# Patient Record
Sex: FEMALE | URBAN - METROPOLITAN AREA
[De-identification: names, ages, dates, MRNs, and addresses within clinical notes are randomized per-mention and may not be internally consistent; named-entity substitution may affect disease eponyms.]

---

## 2020-12-10 ENCOUNTER — LAB REQUISITION (OUTPATIENT)
Dept: LAB | Facility: HOSPITAL | Age: 80
End: 2020-12-10
Payer: MEDICARE

## 2020-12-10 DIAGNOSIS — Z87.440 PERSONAL HISTORY OF URINARY (TRACT) INFECTIONS: ICD-10-CM

## 2020-12-10 DIAGNOSIS — I10 ESSENTIAL (PRIMARY) HYPERTENSION: ICD-10-CM

## 2020-12-10 PROCEDURE — 87086 URINE CULTURE/COLONY COUNT: CPT | Performed by: FAMILY MEDICINE

## 2020-12-10 PROCEDURE — 81001 URINALYSIS AUTO W/SCOPE: CPT | Performed by: FAMILY MEDICINE

## 2021-12-06 ENCOUNTER — NURSE ONLY (OUTPATIENT)
Dept: LAB | Age: 81
End: 2021-12-06
Attending: INTERNAL MEDICINE
Payer: MEDICARE

## 2021-12-06 DIAGNOSIS — R82.998: Primary | ICD-10-CM

## 2021-12-06 DIAGNOSIS — I10 HTN (HYPERTENSION): ICD-10-CM

## 2021-12-06 PROCEDURE — 82607 VITAMIN B-12: CPT

## 2021-12-06 PROCEDURE — 84443 ASSAY THYROID STIM HORMONE: CPT

## 2021-12-06 PROCEDURE — 82306 VITAMIN D 25 HYDROXY: CPT

## 2021-12-06 PROCEDURE — 80061 LIPID PANEL: CPT

## 2021-12-06 PROCEDURE — 85025 COMPLETE CBC W/AUTO DIFF WBC: CPT

## 2021-12-06 PROCEDURE — 80053 COMPREHEN METABOLIC PANEL: CPT

## 2022-02-09 ENCOUNTER — HOSPITAL ENCOUNTER (EMERGENCY)
Facility: HOSPITAL | Age: 82
Discharge: HOME | End: 2022-02-09
Attending: STUDENT IN AN ORGANIZED HEALTH CARE EDUCATION/TRAINING PROGRAM
Payer: MEDICARE

## 2022-02-09 ENCOUNTER — APPOINTMENT (OUTPATIENT)
Dept: CT IMAGING | Facility: HOSPITAL | Age: 82
End: 2022-02-09
Attending: STUDENT IN AN ORGANIZED HEALTH CARE EDUCATION/TRAINING PROGRAM
Payer: MEDICARE

## 2022-02-09 VITALS
OXYGEN SATURATION: 100 % | BODY MASS INDEX: 29.44 KG/M2 | TEMPERATURE: 98 F | HEART RATE: 67 BPM | SYSTOLIC BLOOD PRESSURE: 135 MMHG | HEIGHT: 62 IN | WEIGHT: 160 LBS | DIASTOLIC BLOOD PRESSURE: 83 MMHG | RESPIRATION RATE: 19 BRPM

## 2022-02-09 DIAGNOSIS — F03.90 DEMENTIA WITHOUT BEHAVIORAL DISTURBANCE, UNSPECIFIED DEMENTIA TYPE (HCC): ICD-10-CM
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
## 2022-02-09 DIAGNOSIS — D64.9 ANEMIA, UNSPECIFIED TYPE: ICD-10-CM

## 2022-02-09 LAB
ALBUMIN SERPL-MCNC: 3.2 G/DL (ref 3.4–5)
ALBUMIN/GLOB SERPL: 1.1 {RATIO} (ref 1–2)
ALP LIVER SERPL-CCNC: 51 U/L
ALT SERPL-CCNC: 17 U/L
APTT PPP: 26.7 SECONDS (ref 23.3–35.6)
AST SERPL-CCNC: 14 U/L (ref 15–37)
BASOPHILS # BLD AUTO: 0.03 X10(3) UL (ref 0–0.2)
BASOPHILS NFR BLD AUTO: 0.6 %
BILIRUB SERPL-MCNC: 0.4 MG/DL (ref 0.1–2)
BILIRUB UR QL STRIP.AUTO: NEGATIVE
BUN BLD-MCNC: 24 MG/DL (ref 7–18)
CALCIUM BLD-MCNC: 9 MG/DL (ref 8.5–10.1)
CHLORIDE SERPL-SCNC: 111 MMOL/L (ref 98–112)
CLARITY UR REFRACT.AUTO: CLEAR
CO2 SERPL-SCNC: 26 MMOL/L (ref 21–32)
CREAT BLD-MCNC: 0.56 MG/DL
EOSINOPHIL # BLD AUTO: 0.1 X10(3) UL (ref 0–0.7)
EOSINOPHIL NFR BLD AUTO: 1.9 %
ERYTHROCYTE [DISTWIDTH] IN BLOOD BY AUTOMATED COUNT: 13.2 %
GLOBULIN PLAS-MCNC: 3 G/DL (ref 2.8–4.4)
GLUCOSE BLD-MCNC: 86 MG/DL (ref 70–99)
GLUCOSE UR STRIP.AUTO-MCNC: NEGATIVE MG/DL
HCT VFR BLD AUTO: 33.6 %
HGB BLD-MCNC: 11.1 G/DL
IMM GRANULOCYTES # BLD AUTO: 0.02 X10(3) UL (ref 0–1)
IMM GRANULOCYTES NFR BLD: 0.4 %
INR BLD: 1.17 (ref 0.8–1.2)
KETONES UR STRIP.AUTO-MCNC: NEGATIVE MG/DL
LEUKOCYTE ESTERASE UR QL STRIP.AUTO: NEGATIVE
LIPASE SERPL-CCNC: 136 U/L (ref 73–393)
LYMPHOCYTES # BLD AUTO: 1.46 X10(3) UL (ref 1–4)
LYMPHOCYTES NFR BLD AUTO: 28 %
MCH RBC QN AUTO: 28.7 PG (ref 26–34)
MCHC RBC AUTO-ENTMCNC: 33 G/DL (ref 31–37)
MCV RBC AUTO: 86.8 FL
MONOCYTES # BLD AUTO: 0.56 X10(3) UL (ref 0.1–1)
MONOCYTES NFR BLD AUTO: 10.7 %
NEUTROPHILS # BLD AUTO: 3.04 X10 (3) UL (ref 1.5–7.7)
NEUTROPHILS # BLD AUTO: 3.04 X10(3) UL (ref 1.5–7.7)
NEUTROPHILS NFR BLD AUTO: 58.4 %
NITRITE UR QL STRIP.AUTO: NEGATIVE
OSMOLALITY SERPL CALC.SUM OF ELEC: 295 MOSM/KG (ref 275–295)
PH UR STRIP.AUTO: 7 [PH] (ref 5–8)
PLATELET # BLD AUTO: 253 10(3)UL (ref 150–450)
POTASSIUM SERPL-SCNC: 3.6 MMOL/L (ref 3.5–5.1)
PROT SERPL-MCNC: 6.2 G/DL (ref 6.4–8.2)
PROT UR STRIP.AUTO-MCNC: NEGATIVE MG/DL
PROTHROMBIN TIME: 14.9 SECONDS (ref 11.6–14.8)
RBC # BLD AUTO: 3.87 X10(6)UL
SODIUM SERPL-SCNC: 141 MMOL/L (ref 136–145)
SP GR UR STRIP.AUTO: 1.01 (ref 1–1.03)
TROPONIN I HIGH SENSITIVITY: 7 NG/L
WBC # BLD AUTO: 5.2 X10(3) UL (ref 4–11)

## 2022-02-09 PROCEDURE — 74177 CT ABD & PELVIS W/CONTRAST: CPT | Performed by: STUDENT IN AN ORGANIZED HEALTH CARE EDUCATION/TRAINING PROGRAM

## 2022-02-09 PROCEDURE — 99284 EMERGENCY DEPT VISIT MOD MDM: CPT

## 2022-02-09 PROCEDURE — 72125 CT NECK SPINE W/O DYE: CPT | Performed by: STUDENT IN AN ORGANIZED HEALTH CARE EDUCATION/TRAINING PROGRAM

## 2022-02-09 PROCEDURE — 70450 CT HEAD/BRAIN W/O DYE: CPT | Performed by: STUDENT IN AN ORGANIZED HEALTH CARE EDUCATION/TRAINING PROGRAM

## 2022-02-09 PROCEDURE — 83690 ASSAY OF LIPASE: CPT | Performed by: STUDENT IN AN ORGANIZED HEALTH CARE EDUCATION/TRAINING PROGRAM

## 2022-02-09 PROCEDURE — 93010 ELECTROCARDIOGRAM REPORT: CPT

## 2022-02-09 PROCEDURE — 85730 THROMBOPLASTIN TIME PARTIAL: CPT | Performed by: STUDENT IN AN ORGANIZED HEALTH CARE EDUCATION/TRAINING PROGRAM

## 2022-02-09 PROCEDURE — 85025 COMPLETE CBC W/AUTO DIFF WBC: CPT | Performed by: STUDENT IN AN ORGANIZED HEALTH CARE EDUCATION/TRAINING PROGRAM

## 2022-02-09 PROCEDURE — 36415 COLL VENOUS BLD VENIPUNCTURE: CPT

## 2022-02-09 PROCEDURE — 81001 URINALYSIS AUTO W/SCOPE: CPT | Performed by: STUDENT IN AN ORGANIZED HEALTH CARE EDUCATION/TRAINING PROGRAM

## 2022-02-09 PROCEDURE — 84484 ASSAY OF TROPONIN QUANT: CPT | Performed by: STUDENT IN AN ORGANIZED HEALTH CARE EDUCATION/TRAINING PROGRAM

## 2022-02-09 PROCEDURE — 80053 COMPREHEN METABOLIC PANEL: CPT | Performed by: STUDENT IN AN ORGANIZED HEALTH CARE EDUCATION/TRAINING PROGRAM

## 2022-02-09 PROCEDURE — 93005 ELECTROCARDIOGRAM TRACING: CPT

## 2022-02-09 PROCEDURE — 85610 PROTHROMBIN TIME: CPT | Performed by: STUDENT IN AN ORGANIZED HEALTH CARE EDUCATION/TRAINING PROGRAM

## 2022-02-09 RX ORDER — ALPRAZOLAM 0.25 MG/1
0.25 TABLET ORAL NIGHTLY PRN
COMMUNITY

## 2022-02-09 RX ORDER — MEMANTINE HYDROCHLORIDE 5 MG/1
5 TABLET ORAL 2 TIMES DAILY
COMMUNITY

## 2022-02-09 RX ORDER — METOPROLOL SUCCINATE 25 MG/1
25 TABLET, EXTENDED RELEASE ORAL DAILY
COMMUNITY

## 2022-02-09 RX ORDER — ERGOCALCIFEROL 1.25 MG/1
CAPSULE ORAL
COMMUNITY

## 2022-02-09 RX ORDER — IOHEXOL 350 MG/ML
100 INJECTION, SOLUTION INTRAVENOUS
Status: COMPLETED | OUTPATIENT
Start: 2022-02-09 | End: 2022-02-09

## 2022-02-09 RX ORDER — DOCUSATE SODIUM 100 MG/1
100 CAPSULE, LIQUID FILLED ORAL 2 TIMES DAILY
COMMUNITY

## 2022-02-09 RX ORDER — PANTOPRAZOLE SODIUM 40 MG/1
40 TABLET, DELAYED RELEASE ORAL
COMMUNITY

## 2022-02-09 NOTE — ED INITIAL ASSESSMENT (HPI)
Patient here via EMS with c/o fall. NH staff found patient on floor next to her bed wrapped in her comforter while doing their rounds. Patient at baseline AOx1. C-collar applied by EMS.

## 2022-02-09 NOTE — ED QUICK NOTES
Pt resting in cart with son at bedside, remains in c-collar pending ct results, moaning while bp cuff inflating, on monitor

## 2022-02-10 LAB
ATRIAL RATE: 69 BPM
P AXIS: 72 DEGREES
P-R INTERVAL: 174 MS
Q-T INTERVAL: 436 MS
QRS DURATION: 68 MS
QTC CALCULATION (BEZET): 467 MS
R AXIS: 63 DEGREES
T AXIS: 71 DEGREES
VENTRICULAR RATE: 69 BPM

## 2024-10-01 ENCOUNTER — APPOINTMENT (OUTPATIENT)
Dept: CT IMAGING | Facility: HOSPITAL | Age: 84
End: 2024-10-01
Attending: EMERGENCY MEDICINE
Payer: MEDICARE

## 2024-10-01 ENCOUNTER — HOSPITAL ENCOUNTER (INPATIENT)
Facility: HOSPITAL | Age: 84
LOS: 3 days | Discharge: NURSING, REHAB OR LONG-TERM CARE | End: 2024-10-04
Attending: EMERGENCY MEDICINE | Admitting: INTERNAL MEDICINE
Payer: MEDICARE

## 2024-10-01 ENCOUNTER — APPOINTMENT (OUTPATIENT)
Dept: ULTRASOUND IMAGING | Facility: HOSPITAL | Age: 84
End: 2024-10-01
Attending: EMERGENCY MEDICINE
Payer: MEDICARE

## 2024-10-01 DIAGNOSIS — G30.1 SEVERE LATE ONSET ALZHEIMER'S DEMENTIA, UNSPECIFIED WHETHER BEHAVIORAL, PSYCHOTIC, OR MOOD DISTURBANCE OR ANXIETY (HCC): ICD-10-CM

## 2024-10-01 DIAGNOSIS — I82.412 ACUTE DEEP VEIN THROMBOSIS (DVT) OF FEMORAL VEIN OF LEFT LOWER EXTREMITY (HCC): Primary | ICD-10-CM

## 2024-10-01 DIAGNOSIS — F02.C0 SEVERE LATE ONSET ALZHEIMER'S DEMENTIA, UNSPECIFIED WHETHER BEHAVIORAL, PSYCHOTIC, OR MOOD DISTURBANCE OR ANXIETY (HCC): ICD-10-CM

## 2024-10-01 DIAGNOSIS — H10.32 ACUTE BACTERIAL CONJUNCTIVITIS OF LEFT EYE: ICD-10-CM

## 2024-10-01 LAB
ALBUMIN SERPL-MCNC: 3.7 G/DL (ref 3.2–4.8)
ALBUMIN/GLOB SERPL: 1.4 {RATIO} (ref 1–2)
ALP LIVER SERPL-CCNC: 83 U/L
ALT SERPL-CCNC: <7 U/L
ANION GAP SERPL CALC-SCNC: 5 MMOL/L (ref 0–18)
APTT PPP: 27.5 SECONDS (ref 23–36)
AST SERPL-CCNC: 13 U/L (ref ?–34)
ATRIAL RATE: 85 BPM
BASOPHILS # BLD AUTO: 0.05 X10(3) UL (ref 0–0.2)
BASOPHILS NFR BLD AUTO: 0.7 %
BILIRUB SERPL-MCNC: 0.3 MG/DL (ref 0.2–1.1)
BUN BLD-MCNC: 26 MG/DL (ref 9–23)
BUN/CREAT SERPL: 39.4 (ref 10–20)
CALCIUM BLD-MCNC: 9.5 MG/DL (ref 8.7–10.4)
CHLORIDE SERPL-SCNC: 111 MMOL/L (ref 98–112)
CO2 SERPL-SCNC: 28 MMOL/L (ref 21–32)
CREAT BLD-MCNC: 0.66 MG/DL
DEPRECATED RDW RBC AUTO: 43.9 FL (ref 35.1–46.3)
EGFRCR SERPLBLD CKD-EPI 2021: 85 ML/MIN/1.73M2 (ref 60–?)
EOSINOPHIL # BLD AUTO: 0.19 X10(3) UL (ref 0–0.7)
EOSINOPHIL NFR BLD AUTO: 2.8 %
ERYTHROCYTE [DISTWIDTH] IN BLOOD BY AUTOMATED COUNT: 13 % (ref 11–15)
GLOBULIN PLAS-MCNC: 2.6 G/DL (ref 2–3.5)
GLUCOSE BLD-MCNC: 126 MG/DL (ref 70–99)
HCT VFR BLD AUTO: 36.2 %
HGB BLD-MCNC: 11.2 G/DL
IMM GRANULOCYTES # BLD AUTO: 0.02 X10(3) UL (ref 0–1)
IMM GRANULOCYTES NFR BLD: 0.3 %
INR BLD: 1.16 (ref 0.8–1.2)
LYMPHOCYTES # BLD AUTO: 0.99 X10(3) UL (ref 1–4)
LYMPHOCYTES NFR BLD AUTO: 14.4 %
MCH RBC QN AUTO: 28.3 PG (ref 26–34)
MCHC RBC AUTO-ENTMCNC: 30.9 G/DL (ref 31–37)
MCV RBC AUTO: 91.4 FL
MONOCYTES # BLD AUTO: 0.62 X10(3) UL (ref 0.1–1)
MONOCYTES NFR BLD AUTO: 9 %
MRSA DNA SPEC QL NAA+PROBE: NEGATIVE
NEUTROPHILS # BLD AUTO: 5.01 X10 (3) UL (ref 1.5–7.7)
NEUTROPHILS # BLD AUTO: 5.01 X10(3) UL (ref 1.5–7.7)
NEUTROPHILS NFR BLD AUTO: 72.8 %
OSMOLALITY SERPL CALC.SUM OF ELEC: 304 MOSM/KG (ref 275–295)
P AXIS: 50 DEGREES
P-R INTERVAL: 154 MS
PLATELET # BLD AUTO: 297 10(3)UL (ref 150–450)
POTASSIUM SERPL-SCNC: 4.4 MMOL/L (ref 3.5–5.1)
PROT SERPL-MCNC: 6.3 G/DL (ref 5.7–8.2)
PROTHROMBIN TIME: 15.5 SECONDS (ref 11.6–14.8)
Q-T INTERVAL: 374 MS
QRS DURATION: 70 MS
QTC CALCULATION (BEZET): 445 MS
R AXIS: 15 DEGREES
RBC # BLD AUTO: 3.96 X10(6)UL
SODIUM SERPL-SCNC: 144 MMOL/L (ref 136–145)
T AXIS: 54 DEGREES
TROPONIN I SERPL HS-MCNC: 4 NG/L
VENTRICULAR RATE: 85 BPM
WBC # BLD AUTO: 6.9 X10(3) UL (ref 4–11)

## 2024-10-01 PROCEDURE — 93971 EXTREMITY STUDY: CPT | Performed by: EMERGENCY MEDICINE

## 2024-10-01 PROCEDURE — 74177 CT ABD & PELVIS W/CONTRAST: CPT | Performed by: EMERGENCY MEDICINE

## 2024-10-01 PROCEDURE — 71260 CT THORAX DX C+: CPT | Performed by: EMERGENCY MEDICINE

## 2024-10-01 RX ORDER — BISACODYL 10 MG
10 SUPPOSITORY, RECTAL RECTAL DAILY PRN
COMMUNITY

## 2024-10-01 RX ORDER — ERGOCALCIFEROL 1.25 MG/1
50000 CAPSULE, LIQUID FILLED ORAL
Status: DISCONTINUED | OUTPATIENT
Start: 2024-10-13 | End: 2024-10-04

## 2024-10-01 RX ORDER — ERYTHROMYCIN 5 MG/G
1 OINTMENT OPHTHALMIC ONCE
Status: COMPLETED | OUTPATIENT
Start: 2024-10-01 | End: 2024-10-01

## 2024-10-01 RX ORDER — METOPROLOL TARTRATE 25 MG/1
25 TABLET, FILM COATED ORAL 2 TIMES DAILY
Status: DISCONTINUED | OUTPATIENT
Start: 2024-10-01 | End: 2024-10-04

## 2024-10-01 RX ORDER — METOPROLOL TARTRATE 25 MG/1
25 TABLET, FILM COATED ORAL 2 TIMES DAILY
COMMUNITY

## 2024-10-01 RX ORDER — POLYETHYLENE GLYCOL 3350 17 G/17G
17 POWDER, FOR SOLUTION ORAL DAILY
COMMUNITY

## 2024-10-01 RX ORDER — ACETAMINOPHEN 325 MG/1
650 TABLET ORAL EVERY 8 HOURS PRN
COMMUNITY

## 2024-10-01 RX ORDER — MINERAL OIL, PETROLATUM, PHENYLEPHRINE HCL 14; 74.9; .25 G/100G; G/100G; G/100G
1 OINTMENT RECTAL EVERY EVENING
COMMUNITY

## 2024-10-01 RX ORDER — HEPARIN SODIUM 1000 [USP'U]/ML
80 INJECTION, SOLUTION INTRAVENOUS; SUBCUTANEOUS ONCE
Status: COMPLETED | OUTPATIENT
Start: 2024-10-01 | End: 2024-10-01

## 2024-10-01 RX ORDER — SODIUM PHOSPHATE, DIBASIC AND SODIUM PHOSPHATE, MONOBASIC 7; 19 G/230ML; G/230ML
1 ENEMA RECTAL ONCE AS NEEDED
Status: ACTIVE | OUTPATIENT
Start: 2024-10-01 | End: 2024-10-01

## 2024-10-01 RX ORDER — HEPARIN SODIUM AND DEXTROSE 10000; 5 [USP'U]/100ML; G/100ML
18 INJECTION INTRAVENOUS ONCE
Status: COMPLETED | OUTPATIENT
Start: 2024-10-01 | End: 2024-10-02

## 2024-10-01 RX ORDER — POLYETHYLENE GLYCOL 3350 17 G/17G
17 POWDER, FOR SOLUTION ORAL DAILY
Status: DISCONTINUED | OUTPATIENT
Start: 2024-10-01 | End: 2024-10-04

## 2024-10-01 RX ORDER — MEMANTINE HYDROCHLORIDE 5 MG/1
5 TABLET ORAL 2 TIMES DAILY
Status: DISCONTINUED | OUTPATIENT
Start: 2024-10-01 | End: 2024-10-04

## 2024-10-01 RX ORDER — PANTOPRAZOLE SODIUM 20 MG/1
20 TABLET, DELAYED RELEASE ORAL
Status: DISCONTINUED | OUTPATIENT
Start: 2024-10-02 | End: 2024-10-04

## 2024-10-01 RX ORDER — ACETAMINOPHEN 325 MG/1
650 TABLET ORAL EVERY 8 HOURS PRN
Status: DISCONTINUED | OUTPATIENT
Start: 2024-10-01 | End: 2024-10-04

## 2024-10-01 RX ORDER — HEPARIN SODIUM AND DEXTROSE 10000; 5 [USP'U]/100ML; G/100ML
INJECTION INTRAVENOUS CONTINUOUS
Status: DISCONTINUED | OUTPATIENT
Start: 2024-10-01 | End: 2024-10-03

## 2024-10-01 RX ORDER — SODIUM PHOSPHATE,MONO-DIBASIC 19G-7G/118
1 ENEMA (ML) RECTAL ONCE AS NEEDED
COMMUNITY

## 2024-10-01 RX ORDER — BISACODYL 10 MG
10 SUPPOSITORY, RECTAL RECTAL DAILY PRN
Status: DISCONTINUED | OUTPATIENT
Start: 2024-10-01 | End: 2024-10-04

## 2024-10-01 RX ORDER — ENOXAPARIN SODIUM 100 MG/ML
80 INJECTION SUBCUTANEOUS ONCE
Status: DISCONTINUED | OUTPATIENT
Start: 2024-10-01 | End: 2024-10-01

## 2024-10-01 NOTE — ED PROVIDER NOTES
Patient Seen in: [facility] Emergency Department    History     Chief Complaint   Patient presents with    Swelling Edema       HPI    84-year-old female who presents to the ED today from the nursing home due to a rule out DVT.  Patient's doctor went to the nurse from evaluator today noted some left leg swelling.  Patient has been in a wheelchair and not been mobile recently.  Patient unable to give history so all history from nursing home records, son was at the bedside.    History reviewed.   Past Medical History:    Dementia (HCC)    Essential hypertension       History reviewed. History reviewed. No pertinent surgical history.      Medications :  (Not in a hospital admission)       History reviewed. No pertinent family history.    Smoking Status:   Social History     Socioeconomic History    Marital status:    Tobacco Use    Smoking status: Never    Smokeless tobacco: Never   Substance and Sexual Activity    Alcohol use: Not Currently       Constitutional and vital signs reviewed.      Social History and Family History elements reviewed from today, pertinent positives to the presenting problem noted.    Physical Exam     ED Triage Vitals [10/01/24 1309]   /78   Pulse 98   Resp 16   Temp 97.3 °F (36.3 °C)   Temp src Temporal   SpO2 98 %   O2 Device None (Room air)       All measures to prevent infection transmission during my interaction with the patient were taken. Handwashing was performed prior to and after the exam.  Stethoscope and any equipment used during my examination was cleaned with super sani-cloth germicidal wipes following the exam.     Physical Exam  Vitals reviewed.   Eyes:      General:         Left eye: Discharge present.  Cardiovascular:      Rate and Rhythm: Normal rate.   Pulmonary:      Effort: Pulmonary effort is normal.   Abdominal:      Palpations: Abdomen is soft.   Skin:     General: Skin is warm and dry.      Capillary Refill: Capillary refill takes less than 2  seconds.      Comments: Left lower leg +2 pitting edema from the foot up into the knee.   Neurological:      Mental Status: She is alert.         ED Course        Labs Reviewed   CBC WITH DIFFERENTIAL WITH PLATELET - Abnormal; Notable for the following components:       Result Value    HGB 11.2 (*)     MCHC 30.9 (*)     Lymphocyte Absolute 0.99 (*)     All other components within normal limits   COMP METABOLIC PANEL (14) - Abnormal; Notable for the following components:    Glucose 126 (*)     BUN 26 (*)     BUN/CREA Ratio 39.4 (*)     Calculated Osmolality 304 (*)     ALT <7 (*)     All other components within normal limits   PROTHROMBIN TIME (PT) - Abnormal; Notable for the following components:    PT 15.5 (*)     All other components within normal limits   TROPONIN I HIGH SENSITIVITY - Normal   PTT, ACTIVATED - Normal   RAINBOW DRAW BLUE   RAINBOW DRAW GOLD   ED/MRSA SCREEN BY PCR-CC     EKG    Rate, intervals and axes as noted on EKG Report.  Rate: 85  Rhythm: Sinus Rhythm  Reading: Normal sinus rhythm, heart rate 85, normal overall, normal axis           As Interpreted by me    Imaging Results Available and Reviewed while in ED: CT VENOGRAPHY ABDOMEN PELVIS (W/ CONTRAST) (CPT=74177)    Result Date: 10/1/2024  CONCLUSION:  1. Occlusive DVT involving left deep femoral vein, femoral vein, left external iliac vein and internal iliac vein.  Left common iliac vein is compressed by the right common iliac artery suggesting May-Thurner syndrome. 2. Mild nonspecific hazy infiltration of the small bowel mesentery.  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 3. Generalized mural thickening of urinary bladder suggests cystitis.  Correlate with urinalysis. 4. Vertebral compression fractures including a severe L1 vertebral compression fracture.  1.   Dictated by (CST): Gilmar Luis MD on 10/01/2024 at 5:09 PM     Finalized by (CST): Gilmar Luis MD on 10/01/2024 at 5:19 PM          CT CHEST PE AORTA  (IV ONLY) (CPT=71260)    Result Date: 10/1/2024  CONCLUSION:  1. No pulmonary embolus or other acute finding.    Dictated by (CST): Gilmar Luis MD on 10/01/2024 at 5:03 PM     Finalized by (CST): Gilmar Luis MD on 10/01/2024 at 5:09 PM          US VENOUS DOPPLER LEG LEFT - DIAG IMG (CPT=93971)    Result Date: 10/1/2024  CONCLUSION:  1. Extensive nearly occlusive deep venous thrombosis throughout the left leg from the common femoral down to the calf region.  Superficial venous thrombus visualized in the proximal GSV.    Dictated by (CST): Dayron Sinclair MD on 10/01/2024 at 3:07 PM     Finalized by (CST): Dayron Sinclair MD on 10/01/2024 at 3:12 PM         ED Medications Administered:   Medications   heparin (Porcine) 1000 UNIT/ML injection - BOLUS IV 5,800 Units (has no administration in time range)   heparin (Porcine) 99700 units/250 mL infusion ED (PE/DVT/THROMBUS) INITIAL DOSE (has no administration in time range)   heparin (Porcine) 27473 units/250mL infusion PE/DVT/THROMBUS CONTINUOUS (has no administration in time range)   erythromycin (Romycin) 5 MG/GM ophthalmic ointment 1 Application (1 Application Left Eye Given 10/1/24 1458)   iopamidol 76% (ISOVUE-370) injection for power injector (80 mL Intravenous Given 10/1/24 1649)         MDM     Vitals:    10/01/24 1316 10/01/24 1600 10/01/24 1645 10/01/24 1808   BP:       Pulse:  89 90    Resp:  18 14    Temp: 98 °F (36.7 °C)      TempSrc: Rectal      SpO2:  99% 99%    Weight:    72 kg     *I personally reviewed and interpreted all ED vitals.    Pulse Ox: 98%, Room air, Normal     Monitor Interpretation:   normal sinus rhythm as interpreted by me.  The cardiac monitor was ordered to monitor cardiac rate and rhythm.    Differential Diagnosis/ Diagnostic Considerations: DVT, PE, cellulitis    Complicating Factors: The patient already has does not have any pertinent problems on file. to contribute to the complexity of this ED evaluation.    Medical  Decision Making  Amount and/or Complexity of Data Reviewed  Labs: ordered. Decision-making details documented in ED Course.  Radiology: ordered. Decision-making details documented in ED Course.  ECG/medicine tests: ordered and independent interpretation performed. Decision-making details documented in ED Course.    Risk  Decision regarding hospitalization.    Critical Care  Total time providing critical care: 30 minutes      I reviewed all labs with patient's family at the bedside.  Nothing acute on labs however left lower leg ultrasound showed DVT from the left femoral vein down to the knee.  Will get a CT chest to rule out PE and get a CT abdomen venography study.    I reviewed CT results with patient's family.  The DVT does go up into the iliac veins in the abdomen.  No evidence of PE.  Will start heparin drip and need to admit.  Will also consult interventional radiology.  They agree with this plan    I discussed case with patient's primary care provider Dr. Davis who accepts admission    I discussed case with interventional radiology Dr. Rubalcava.  Agrees with heparin drip, keep patient n.p.o. after midnight and will evaluate for possible stent placement in the morning.       Condition upon leaving the department: Stable    Disposition and Plan     Clinical Impression:  1. Acute deep vein thrombosis (DVT) of femoral vein of left lower extremity (HCC)    2. Acute bacterial conjunctivitis of left eye        Disposition:  Admit    Follow-up:  No follow-up provider specified.    Medications Prescribed:  Current Discharge Medication List          Hospital Problems       Present on Admission             ICD-10-CM Noted POA    * (Principal) Acute deep vein thrombosis (DVT) of femoral vein of left lower extremity (HCC) I82.412 10/1/2024 Unknown

## 2024-10-01 NOTE — ED QUICK NOTES
Orders for admission, patient is aware of plan and ready to go upstairs. Any questions, please call ED RN velvet at extension [phone].     Patient Covid vaccination status: Unvaccinated     COVID Test Ordered in ED: None    COVID Suspicion at Admission: N/A    Running Infusions:    See MAR      Mental Status/LOC at time of transport: Alert to self per baseline, hx of dementia     Other pertinent information:   CIWA score: N/A   NIH score:  N/A

## 2024-10-01 NOTE — ED INITIAL ASSESSMENT (HPI)
Pt arrives via EMS from [facility], pt has left leg swelling that staff noticed last night but are unsure of how long swelling has been there.   Pt has hx of dementia and mumbles, per baseline.

## 2024-10-02 LAB
APTT PPP: 117.8 SECONDS (ref 23–36)
APTT PPP: 184.2 SECONDS (ref 23–36)
APTT PPP: 33.1 SECONDS (ref 23–36)
APTT PPP: 96.1 SECONDS (ref 23–36)

## 2024-10-02 RX ORDER — HEPARIN SODIUM 1000 [USP'U]/ML
30 INJECTION, SOLUTION INTRAVENOUS; SUBCUTANEOUS ONCE
Status: COMPLETED | OUTPATIENT
Start: 2024-10-02 | End: 2024-10-02

## 2024-10-02 NOTE — PLAN OF CARE
Patient alert and oriented x 0, which is her baseline per son's report. Pt is on room air and is voiding via purewick. Patient denies pain. Plan to monitor left leg for worsening swelling, any discoloration, or other changes. Continue with Heparin drip to prevent worsening of DVT. Family updated on plan of care, no further questions asked. Plan to discharge once medically clear. Call light within reach.    Problem: Patient Centered Care  Goal: Patient preferences are identified and integrated in the patient's plan of care  Description: Interventions:  - What would you like us to know as we care for you? Patient is from [facility].   - Provide timely, complete, and accurate information to patient/family  - Incorporate patient and family knowledge, values, beliefs, and cultural backgrounds into the planning and delivery of care  - Encourage patient/family to participate in care and decision-making at the level they choose  - Honor patient and family perspectives and choices  Outcome: Progressing     Problem: Patient/Family Goals  Goal: Patient/Family Long Term Goal  Description: Patient's Long Term Goal: To discharge.     Interventions:  - Follow medical plan of care.   - See additional Care Plan goals for specific interventions  Outcome: Progressing  Goal: Patient/Family Short Term Goal  Description: Patient's Short Term Goal: To prevent DVT from worsening.     Interventions:   - Monitor for SOB, monitor LLE, continue Heparin drip.  - See additional Care Plan goals for specific interventions  Outcome: Progressing     Problem: CARDIOVASCULAR - ADULT  Goal: Maintains optimal cardiac output and hemodynamic stability  Description: INTERVENTIONS:  - Monitor vital signs, rhythm, and trends  - Monitor for bleeding, hypotension and signs of decreased cardiac output  - Evaluate effectiveness of vasoactive medications to optimize hemodynamic stability  - Monitor arterial and/or venous puncture sites for bleeding and/or  hematoma  - Assess quality of pulses, skin color and temperature  - Assess for signs of decreased coronary artery perfusion - ex. Angina  - Evaluate fluid balance, assess for edema, trend weights  Outcome: Progressing  Goal: Absence of cardiac arrhythmias or at baseline  Description: INTERVENTIONS:  - Continuous cardiac monitoring, monitor vital signs, obtain 12 lead EKG if indicated  - Evaluate effectiveness of antiarrhythmic and heart rate control medications as ordered  - Initiate emergency measures for life threatening arrhythmias  - Monitor electrolytes and administer replacement therapy as ordered  Outcome: Progressing     Problem: RESPIRATORY - ADULT  Goal: Achieves optimal ventilation and oxygenation  Description: INTERVENTIONS:  - Assess for changes in respiratory status  - Assess for changes in mentation and behavior  - Position to facilitate oxygenation and minimize respiratory effort  - Oxygen supplementation based on oxygen saturation or ABGs  - Provide Smoking Cessation handout, if applicable  - Encourage broncho-pulmonary hygiene including cough, deep breathe, Incentive Spirometry  - Assess the need for suctioning and perform as needed  - Assess and instruct to report SOB or any respiratory difficulty  - Respiratory Therapy support as indicated  - Manage/alleviate anxiety  - Monitor for signs/symptoms of CO2 retention  Outcome: Progressing     Problem: SKIN/TISSUE INTEGRITY - ADULT  Goal: Skin integrity remains intact  Description: INTERVENTIONS  - Assess and document risk factors for pressure ulcer development  - Assess and document skin integrity  - Monitor for areas of redness and/or skin breakdown  - Initiate interventions, skin care algorithm/standards of care as needed  Outcome: Progressing

## 2024-10-02 NOTE — CONSULTS
[facility]  part of [facility]    Report of Consultation    Joann Williamson Patient Status:  Inpatient    1940 MRN [id number]   Location [facility] 3W/SW Attending Dylon Davis MD   Hosp Day # 1 PCP Ruslan Galarza MD     Reason for Consultation:  Left leg swelling, DVT.    History of Present Illness:  Joann Williamson is a a(n) 84 year old female who presents from her nursing home with extensive left leg swelling.  She is essentially wheel chair bound, per her son, she was able to stand to transfer until recently when she developed swelling.  She has dementia and is non-verbal.    History:  Past Medical History:    Dementia (HCC)    Esophageal reflux    Essential hypertension     History reviewed. No pertinent surgical history.  History reviewed. No pertinent family history.   reports that she has never smoked. She has never used smokeless tobacco. She reports that she does not currently use alcohol.    Allergies:  No Known Allergies    Medications:    Current Facility-Administered Medications:     heparin (Porcine) 34073 units/250mL infusion PE/DVT/THROMBUS CONTINUOUS, 200-3,000 Units/hr, Intravenous, Continuous    polyethylene glycol (PEG 3350) (Miralax) 17 g oral packet 17 g, 17 g, Oral, Daily    pantoprazole (Protonix) DR tab 20 mg, 20 mg, Oral, QAM AC    ergocalciferol (Vitamin D2) cap 50,000 Units, 50,000 Units, Oral, Q14 Days    docusate (Colace) 50 MG/5ML oral solution 100 mg, 100 mg, Oral, BID PRN    memantine (Namenda) tab 5 mg, 5 mg, Oral, BID    acetaminophen (Tylenol) tab 650 mg, 650 mg, Oral, Q8H PRN    metoprolol tartrate (Lopressor) tab 25 mg, 25 mg, Oral, BID    bisacodyl (Dulcolax) 10 MG rectal suppository 10 mg, 10 mg, Rectal, Daily PRN    phenylephrine-min oil-rita (Formula R) 0.25-14-74.9 % rectal ointment 1 g, 1 g, Rectal, QPM    influenza virus trivalent high dose PF (Fluzone HD) 0.5 mL injection (ages >/= 65 years) 0.5 mL, 0.5 mL, Intramuscular,  Prior to discharge    Review of Systems:  Pertinent items are noted in HPI.    Physical Exam:   General: Alert, cooperative.  No apparent distress. Non-verbal.  Vital Signs:  Blood pressure (!) 137/98, pulse 77, temperature 97.8 °F (36.6 °C), temperature source Axillary, resp. rate 16, height 62\", weight 126 lb 3.2 oz (57.2 kg), SpO2 97%.  HEENT: Exam is unremarkable.  Neck: Supple.  Lungs: Normal respiratory effort  Cardiac: Regular rate and rhythm.  Abdomen:  Nontender.  Extremities: Left lower 2+ pitting edema noted.  No discoloration, good sensation and movement.   Skin: Normal texture and turgor.     Laboratory Data:  Lab Results   Component Value Date    WBC 6.9 10/01/2024    HGB 11.2 10/01/2024    HCT 36.2 10/01/2024    .0 10/01/2024    CREATSERUM 0.66 10/01/2024    BUN 26 10/01/2024     10/01/2024    K 4.4 10/01/2024     10/01/2024    CO2 28.0 10/01/2024     10/01/2024    CA 9.5 10/01/2024    ALB 3.7 10/01/2024    ALKPHO 83 10/01/2024    BILT 0.3 10/01/2024    TP 6.3 10/01/2024    AST 13 10/01/2024    ALT <7 10/01/2024    .8 10/02/2024    INR 1.16 10/01/2024       Imaging:  CT VENOGRAPHY ABDOMEN PELVIS (W/ CONTRAST) (CPT=74177)    Result Date: 10/1/2024  CONCLUSION:  1. Occlusive DVT involving left deep femoral vein, femoral vein, left external iliac vein and internal iliac vein.  Left common iliac vein is compressed by the right common iliac artery suggesting May-Thurner syndrome. 2. Mild nonspecific hazy infiltration of the small bowel mesentery.  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 3. Generalized mural thickening of urinary bladder suggests cystitis.  Correlate with urinalysis. 4. Vertebral compression fractures including a severe L1 vertebral compression fracture.  1.   Dictated by (CST): Gilmar Luis MD on 10/01/2024 at 5:09 PM     Finalized by (CST): Gilmar Luis MD on 10/01/2024 at 5:19 PM          CT CHEST PE AORTA (IV ONLY)  (CPT=71260)    Result Date: 10/1/2024  CONCLUSION:  1. No pulmonary embolus or other acute finding.    Dictated by (CST): Gilmar Luis MD on 10/01/2024 at 5:03 PM     Finalized by (CST): Gilmar Luis MD on 10/01/2024 at 5:09 PM          US VENOUS DOPPLER LEG LEFT - DIAG IMG (CPT=93971)    Result Date: 10/1/2024  CONCLUSION:  1. Extensive nearly occlusive deep venous thrombosis throughout the left leg from the common femoral down to the calf region.  Superficial venous thrombus visualized in the proximal GSV.    Dictated by (CST): Dayron Sinclair MD on 10/01/2024 at 3:07 PM     Finalized by (CST): Dayron Sinclair MD on 10/01/2024 at 3:12 PM           Impression:  Patient Active Problem List   Diagnosis    Acute deep vein thrombosis (DVT) of femoral vein of left lower extremity (HCC)    Acute bacterial conjunctivitis of left eye       Assessment/Plan:  85yo nursing home patient presents with left leg swelling.  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  Joann is non-verbal, son Berto is at bedside.  Per her son, the swelling has improved since being admitted and on Heparin.  We had a long discussion regarding risks and benefits of thrombectomy and iliac vein stenting.  Risks of bleeding in this patient far outweigh benefit.  Stenting of iliac vein could be considered, but again her swelling has been improving and she is essentially non-ambulatory.    Recommend continued anticoagulation, leg elevation.  Should anti-coagulation be contraindicated, IVC filter can be considered.      Thank you for allowing me to participate in the care of your patient.    JUHIWERO GAMEZ, APRN  10/2/2024  9:30 AM

## 2024-10-02 NOTE — PLAN OF CARE
Pt comes from [facility] in [place]. Plan of care discussed with sonBerto. Heparin infusing. NPO.  Problem: CARDIOVASCULAR - ADULT  Goal: Maintains optimal cardiac output and hemodynamic stability  Description: INTERVENTIONS:  - Monitor vital signs, rhythm, and trends  - Monitor for bleeding, hypotension and signs of decreased cardiac output  - Evaluate effectiveness of vasoactive medications to optimize hemodynamic stability  - Monitor arterial and/or venous puncture sites for bleeding and/or hematoma  - Assess quality of pulses, skin color and temperature  - Assess for signs of decreased coronary artery perfusion - ex. Angina  - Evaluate fluid balance, assess for edema, trend weights  Outcome: Progressing  Goal: Absence of cardiac arrhythmias or at baseline  Description: INTERVENTIONS:  - Continuous cardiac monitoring, monitor vital signs, obtain 12 lead EKG if indicated  - Evaluate effectiveness of antiarrhythmic and heart rate control medications as ordered  - Initiate emergency measures for life threatening arrhythmias  - Monitor electrolytes and administer replacement therapy as ordered  Outcome: Progressing     Problem: RESPIRATORY - ADULT  Goal: Achieves optimal ventilation and oxygenation  Description: INTERVENTIONS:  - Assess for changes in respiratory status  - Assess for changes in mentation and behavior  - Position to facilitate oxygenation and minimize respiratory effort  - Oxygen supplementation based on oxygen saturation or ABGs  - Provide Smoking Cessation handout, if applicable  - Encourage broncho-pulmonary hygiene including cough, deep breathe, Incentive Spirometry  - Assess the need for suctioning and perform as needed  - Assess and instruct to report SOB or any respiratory difficulty  - Respiratory Therapy support as indicated  - Manage/alleviate anxiety  - Monitor for signs/symptoms of CO2 retention  Outcome: Progressing     Problem: SKIN/TISSUE INTEGRITY - ADULT  Goal: Skin  integrity remains intact  Description: INTERVENTIONS  - Assess and document risk factors for pressure ulcer development  - Assess and document skin integrity  - Monitor for areas of redness and/or skin breakdown  - Initiate interventions, skin care algorithm/standards of care as needed  Outcome: Progressing

## 2024-10-02 NOTE — SLP NOTE
ADULT SWALLOWING EVALUATION    ASSESSMENT    ASSESSMENT/OVERALL IMPRESSION:  PPE REQUIRED. THIS THERAPIST WORE GLOVES, DROPLET MASK, AND GOGGLES FOR DURATION OF EVALUATION. HANDS WASHED UPON ENTRANCE/EXIT.    SLP BSSE orders received and acknowledged. A swallow evaluation warranted as pt on modified diet of minced and moist/thin liquids at NH. Pt afebrile, non-verbal at baseline, on room air, with oxygen saturation at 95. Pt with no prior hx of dysphagia at [facility]. Pt positioned upright in bed with son at bedside. Pt with no verbal or non-verbal complaints of pain, RN aware. Pt with adequate oral acceptance and bilabial seal across all trials. Pt with intact bite, prolonged mastication of solids, and increased KIERSTEN. Pt's swallow response appears delayed with reduced hyolaryngeal elevation/excursion. No clinical signs of aspiration (e.g., immediate/delayed throat clear, immediate/delayed cough, wet vocal quality, increased O2 effort) observed across all trials. Oxygen status remained >94 t/o the entire evaluation.     At this time, pt presents with mild oral dysphagia and probable pharyngeal dysfunction. Recommend a minced and moist diet and thin liquids with strict adherence to safe swallowing compensatory strategies. Results and recommendations reviewed with RN, pt, and family. Pt's son v/u to all results/recommendations. Recommendations remain written on whiteboard. SLP collaborated with RN for MD diet orders.     PLAN: SLP tof/u x2 meal assessments, monitor CXR, and VFSS if clinically warranted.     RECOMMENDATIONS   Diet Recommendations - Solids: Mechanical soft ground/ Minced & Moist  Diet Recommendations - Liquids: Thin Liquids    Compensatory Strategies Recommended: Slow rate;Alternate consistencies;Small bites and sips  Aspiration Precautions: Upright position;Slow rate;Small bites and sips  Medication Administration Recommendations: Crushed in puree  Treatment Plan/Recommendations: Aspiration  precautions    HISTORY   MEDICAL HISTORY  Reason for Referral: R/O aspiration    Problem List  Principal Problem:    Acute deep vein thrombosis (DVT) of femoral vein of left lower extremity (HCC)  Active Problems:    Acute bacterial conjunctivitis of left eye      Past Medical History  Past Medical History:    Dementia (HCC)    Esophageal reflux    Essential hypertension       Prior Living Situation: Skilled nursing facility  Diet Prior to Admission: Mechanical soft ground/ Minced & Moist;Thin liquids  Precautions: Aspiration    Patient/Family Goals: Pt's son reports modified diet of minced and moist/thin    SWALLOWING HISTORY  Current Diet Consistency: NPO  Dysphagia History: none at [facility]    Imaging Results:     CHEST CT 10/1/24:  CONCLUSION:   1. No pulmonary embolus or other acute finding.          Dictated by (CST): Gilmar Luis MD on 10/01/2024 at 5:03 PM       Finalized by (CST): Gilmar Luis MD on 10/01/2024 at 5:09 PM     OBJECTIVE   ORAL MOTOR EXAMINATION  Dentition: Functional  Symmetry: Unable to assess  Strength: Unable to assess     Range of Motion: Unable to assess  Rate of Motion: Unable to assess    Voice Quality:  (non-verbal/mumbles)  Respiratory Status: Unlabored  Consistencies Trialed: Thin liquids;Puree;Soft solid  Method of Presentation: Staff/Clinician assistance;Spoon;Cup;Single sips;Straw  Patient Positioning: Upright;Leaning to left    Oral Phase of Swallow: Impaired  Bolus Retrieval: Intact  Bilabial Seal: Intact  Bolus Formation: Impaired  Bolus Propulsion: Impaired  Mastication: Impaired       Pharyngeal Phase of Swallow: Impaired  Laryngeal Elevation Timing: Appears impaired  Laryngeal Elevation Strength: Appears impaired     (Please note: Silent aspiration cannot be evaluated clinically. Videofluoroscopic Swallow Study is required to rule-out silent aspiration.)    Esophageal Phase of Swallow: No complaints consistent with possible esophageal involvement    GOALS  Goal #1 The  patient will tolerate minced and moist consistency and thin liquids without overt signs or symptoms of aspiration with 100 % accuracy over 2 session(s).  In Progress   Goal #2 The patient/family/caregiver will demonstrate understanding and implementation of aspiration precautions and swallow strategies independently over 2 session(s).    In Progress       FOLLOW UP  Treatment Plan/Recommendations: Aspiration precautions  Number of Visits to Meet Established Goals: 2  Follow Up Needed (Documentation Required): Yes  SLP Follow-up Date: 10/03/24    Thank you for your referral.   If you have any questions, please contact BIBI Rocha M.S. CCC-SLP  Speech Language Pathologist  Phone Number [phone]

## 2024-10-03 LAB
APTT PPP: 104.6 SECONDS (ref 23–36)
APTT PPP: 81.1 SECONDS (ref 23–36)
GLUCOSE BLDC GLUCOMTR-MCNC: 125 MG/DL (ref 70–99)

## 2024-10-03 PROCEDURE — 99222 1ST HOSP IP/OBS MODERATE 55: CPT | Performed by: STUDENT IN AN ORGANIZED HEALTH CARE EDUCATION/TRAINING PROGRAM

## 2024-10-03 RX ORDER — HEPARIN SODIUM AND DEXTROSE 10000; 5 [USP'U]/100ML; G/100ML
INJECTION INTRAVENOUS CONTINUOUS
Status: ACTIVE | OUTPATIENT
Start: 2024-10-03 | End: 2024-10-03

## 2024-10-03 NOTE — SLP NOTE
SPEECH DAILY NOTE - INPATIENT    ASSESSMENT & PLAN   ASSESSMENT    Proper PPE worn. Hands sanitized upon entrance/exit Pt room.       Pt alert, afebrile and on room air. Pt seen for swallow analysis per BSE recommendations (after consulting with RN). Son present. Pt agreed to participate. Pt's preferred method of learning verbal or demonstration. Pt upright in bed; observed with current diet of minced & moist/thin liquids for monitoring diet tolerance. Swallowing precautions/strategies discussed as SLP directed oral intake. Increased oral transit time observed d/t reduced lingual coordination of bolus. No significant oral retention. Pharyngeal response appeared to trigger within 1-2 sec per hyolaryngeal elevation to completion (functional rise/strength per palpation). No overt CSA on minced & moist/thin liquids. Collaborated with RN regarding Pt's swallowing plan of care. Per RN, Pt with good tolerance of current diet. No report of difficulty taking meds. Sp02 ~95% during this session. Call light within Pt's reach upon SLP discharge from room.      CHEST CT 10/01/24:  CONCLUSION:   1. No pulmonary embolus or other acute finding.        PLAN:    To f/u x1 session to ensure safe intake of diet and reinforce swallowing/aspiration precautions. To monitor for new CHEST CT or any CXR results. Family education to be continued as available.          Diet Recommendations - Solids: Mechanical soft ground/ Minced & Moist  Diet Recommendations - Liquids: Thin Liquids    Compensatory Strategies Recommended: Slow rate;Alternate consistencies;Small bites and sips  Aspiration Precautions: Upright position;Slow rate;Small bites and sips  Medication Administration Recommendations: Crushed in puree    Patient Experiencing Pain: No  Treatment Plan  Treatment Plan/Recommendations: Aspiration precautions  Interdisciplinary Communication: Discussed with RN  Plan posted at bedside      GOALS  Goal #1 The patient will tolerate minced and  moist consistency and thin liquids without overt signs or symptoms of aspiration with 100 % accuracy over 2 session(s).    No CSA on current diet.        In Progress   Goal #2 The patient/family/caregiver will demonstrate understanding and implementation of aspiration precautions and swallow strategies independently over 2 session(s).    Swallowing precautions posted in room. Swallowing strategies discussed with son; v/u.     In Progress     FOLLOW UP  Follow Up Needed (Documentation Required): Yes  SLP Follow-up Date: 10/04/24  Number of Visits to Meet Established Goals: 2    Session: 1    If you have any questions, please contact   Esperanza Marie M.S. [facility]SLP  Speech-Language Pathologist  [facility]  #[phone]

## 2024-10-03 NOTE — H&P
BRIEF ADMITTING NOTE    I saw patient face-to-face in person, and interviewed and examined her in her room 341 at [facility] ([facility]) unit 3SW on 10/2/2024.  Chart reviewed.  Discussed with [facility] memory care unit nurse David, [facility] ER Dr. Phelps, primary dayshift nurse Cara, night shift nurse Barbara, patient's DPOA-HC son John and other son Berto.           84 year-old lady with acute swelling LLE from groin to foot.  First noted by staff at [facility] care unit in PM on 9/30/2024.  Venous Doppler ultrasound examination ordered to be done at [facility].  When ultrasound examination had not been done by the next morning 10/1, patient sent to [facility] ER.  Seen by Dr. Phelps who diagnosed acute DVT LLE as high as proximal common iliac vein.  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  History and examination showed no clinical evidence of pulmonary embolism, and CT scan chest with IV contrast confirmed absence of pulmonary embolism.  IV heparin started.  Admitted inpatient for further management.         Seen by interventional radiology (IR) service, who recommended against invasive intervention.  In particular, IR consultant recommended against iliac vein stenting and also against insertion of IVC filter.  Family notified of diagnosis and recommendation for standard medical treatment of DVT including transition from IV heparin to direct-acting oral anticoagulant therapy.    Patient's DPOA-HC son John appeared to understand and agree with assessment and care plan.    Full admitting note to follow.        Dylon Davis MD.

## 2024-10-03 NOTE — H&P
[facility]    ADMITTING PRIMARY MEDICAL EVALUATION    Joann Williamson Patient Status:  Inpatient    1940 MRN [id number]   Location [facility] 3W/SW Attending Dylon Davis MD   Hosp Day # 1 PCP Dylon Davis MD     I saw patient face-to-face in person, and interviewed and examined her in her room 341 at [facility] ([facility]) unit 3SW on 10/2/2024.  Chart reviewed.  Discussed with [facility] memory care unit nurse David (10/1), [facility] ER Dr. Phelps (10/1), primary dayshift nurse Cara, night shift nurse Barbara, patient's DPOA-HC son John and other son Berto.    HISTORY      CC/HPI:  84 year-old lady with acute swelling LLE from groin to foot.  First noted by staff at [facility] care unit in PM on 2024.  Venous Doppler ultrasound examination ordered to be done at [facility].  When ultrasound examination had not been done by the next morning 10/1, patient sent to [facility] ER.  Seen by Dr. Phelps who diagnosed acute DVT LLE as high as proximal common iliac vein.  Venogram showed findings consistent with compression of common iliac vein by contralateral common iliac artery (?May-Thurner syndrome?).  History and examination showed no clinical evidence of pulmonary embolism, and CT scan chest with IV contrast confirmed absence of pulmonary embolism.  IV heparin started.  Admitted inpatient for further management.         Seen by interventional radiology (IR) service, who recommended against invasive intervention.  In particular, IR consultant recommended against iliac vein stenting and also against insertion of IVC filter.  Family notified of diagnosis and recommendation for standard medical treatment of DVT including transition from IV heparin to direct-acting oral anticoagulant therapy.  PMSH:  See ASSESSMENT AND PLAN for active problems.  Meds:  See Epic - Admission Order Reconciliation for current medication list.  ROS:  No report of fever, acute pain, skin  breakdown or any acute respiratory, GI, genitourinary, cardiovascular, hematologic, endocrine, visual, auditory, neurologic, psychiatric or musculoskeletal symptoms except as noted above in CC/HPI.  FMH:  N/A.  SH:  Residing at [facility] care unit where she receives LTC.  Son Jake has POA-HC.     EXAMINATION     PX:  Alert.  NAD.  Appeared well-nourished and well-hydrated.  Skin pale.  Breathing unlabored.  No recurrent halitosis or swelling over R cheek noted.  No incontinence odor.  Marked swelling of LLE all the way up to groin.  No redness, not warm to touch.  Incoherent as usual, nearly mute.  Auditory acuity difficult to evaluate given severity of dementia.  She did not appear to recognize me.  Motor exam limited by severe dementia but grossly nonfocal.  Blunt affect, usual behavior.  Data:  Report from [facility] memory care unit nurse David, [facility] ER Dr. Phelps, primary dayshift nurse Cara, night shift nurse Barbara, patient's DPOA-HC son John and other son Berto.  Reports on results of clinical lab and imaging studies done at [facility] ER since arrival on 10/1/2024 all noted.    ASSESSMENT AND PLAN    Acute problems:    LLE swelling - Due to acute common iliac DVT in left lower extremity, apparently due to L common iliac vein compression by R common iliac artery (?May-Thurner syndrome?).  Continue IV heparin.  Transition to DOAC after fully therapeutic anticoagulation achieved on IV heparin.  No compression garments until edema resolved.  Consult with hematology about optimal timing for initiation of compression therapy.     Chronic problems:    FEN - At risk.  Continue to monitor intake and balances closely.  Toothache - Resolved last year with antibiotics and tooth extraction.  Tooth decay - Continue regular checkups.    GERD - Appears to be controlled.  Constipation - Controlled.  Continue docusate 100mg liquid bid.  HTN - Monitor and record BP.  Adjust Rx as indicated to keep BP  in target range.  Venous insufficiency - Edema difficult to avoid given her habit of sitting all day long with her legs dependent.  However, edema appeared to be controlled with elastic stockings without diuretic therapy until recently.  No compression garments until edema resolved.  Consult with hematology about optimal timing for initiation of compression therapy.  Hearing impairment - Auditory acuity difficult to evaluate given severity of dementia.   Continue Debrox.  Dementia - Fairly severe.  Continue proxy decisions and supportive care.  Monitor intake and balances closely.  Unsteadiness on feet - Multifactorial.  Continue general falls precautions with additional precautions as indicated specific for patient’s medical condition.  Myalgia - Appears controlled overall.  Monitor for evidence of recurrent muscle ache.    Osteoarthritis - Including TMJ and back pain.  Pain control.  Encouraqge patient to try walking without leaning on her R arm any more than necessary.     Preventive care:    Diet - Recommend low sodium diet.  Habits - Recommend physical exercise activity as tolerated with help, occupation with activities of personal interest and preference, and social engagement, especially with family and friends.   Screening - Recommend COVID test in case of recurrent exposure.  Immunizations - Review history.  Recommend standard immunizations due but not done yet, except in case of medical contraindication.     DISPOSITION    Proxy - DPOA-HC patient's son Jake.  No document on file in [facility] chart.  Obtain copy from SNF records to upload into [facility].  Rescue advance directives - Continue PDNR as documented on POLST form 6/27/2023.  Long-term plan - Continue LTC at [facility] memory care unit.  Next SNF visit:  11/5/2024    Patient's DPOA-HC son John appeared to understand and agree with assessment and care plan.          Dylon Davis MD

## 2024-10-03 NOTE — PLAN OF CARE
RA. Afebrile. Patient remains disoriented/ confused. Heparin gtt discontinued and patient switched to eliquis. Safety precautions in place. Plan: Discharge back to [facility] as soon as tomorrow.   Problem: Patient Centered Care  Goal: Patient preferences are identified and integrated in the patient's plan of care  Description: Interventions:    - Provide timely, complete, and accurate information to patient/family  - Incorporate patient and family knowledge, values, beliefs, and cultural backgrounds into the planning and delivery of care  - Encourage patient/family to participate in care and decision-making at the level they choose  - Honor patient and family perspectives and choices  Outcome: Progressing        Problem: CARDIOVASCULAR - ADULT  Goal: Maintains optimal cardiac output and hemodynamic stability  Description: INTERVENTIONS:  - Monitor vital signs, rhythm, and trends  - Monitor for bleeding, hypotension and signs of decreased cardiac output  - Evaluate effectiveness of vasoactive medications to optimize hemodynamic stability  - Monitor arterial and/or venous puncture sites for bleeding and/or hematoma  - Assess quality of pulses, skin color and temperature  - Assess for signs of decreased coronary artery perfusion - ex. Angina  - Evaluate fluid balance, assess for edema, trend weights  Outcome: Progressing  Goal: Absence of cardiac arrhythmias or at baseline  Description: INTERVENTIONS:  - Continuous cardiac monitoring, monitor vital signs, obtain 12 lead EKG if indicated  - Evaluate effectiveness of antiarrhythmic and heart rate control medications as ordered  - Initiate emergency measures for life threatening arrhythmias  - Monitor electrolytes and administer replacement therapy as ordered  Outcome: Progressing     Problem: RESPIRATORY - ADULT  Goal: Achieves optimal ventilation and oxygenation  Description: INTERVENTIONS:  - Assess for changes in respiratory status  - Assess for changes in  mentation and behavior  - Position to facilitate oxygenation and minimize respiratory effort  - Oxygen supplementation based on oxygen saturation or ABGs  - Provide Smoking Cessation handout, if applicable  - Encourage broncho-pulmonary hygiene including cough, deep breathe, Incentive Spirometry  - Assess the need for suctioning and perform as needed  - Assess and instruct to report SOB or any respiratory difficulty  - Respiratory Therapy support as indicated  - Manage/alleviate anxiety  - Monitor for signs/symptoms of CO2 retention  Outcome: Progressing     Problem: SKIN/TISSUE INTEGRITY - ADULT  Goal: Skin integrity remains intact  Description: INTERVENTIONS  - Assess and document risk factors for pressure ulcer development  - Assess and document skin integrity  - Monitor for areas of redness and/or skin breakdown  - Initiate interventions, skin care algorithm/standards of care as needed  Outcome: Progressing

## 2024-10-03 NOTE — PLAN OF CARE
Heparin infusing. LLE elevated. Bilateral heel protectors in place.   Problem: CARDIOVASCULAR - ADULT  Goal: Maintains optimal cardiac output and hemodynamic stability  Description: INTERVENTIONS:  - Monitor vital signs, rhythm, and trends  - Monitor for bleeding, hypotension and signs of decreased cardiac output  - Evaluate effectiveness of vasoactive medications to optimize hemodynamic stability  - Monitor arterial and/or venous puncture sites for bleeding and/or hematoma  - Assess quality of pulses, skin color and temperature  - Assess for signs of decreased coronary artery perfusion - ex. Angina  - Evaluate fluid balance, assess for edema, trend weights  Outcome: Progressing  Goal: Absence of cardiac arrhythmias or at baseline  Description: INTERVENTIONS:  - Continuous cardiac monitoring, monitor vital signs, obtain 12 lead EKG if indicated  - Evaluate effectiveness of antiarrhythmic and heart rate control medications as ordered  - Initiate emergency measures for life threatening arrhythmias  - Monitor electrolytes and administer replacement therapy as ordered  Outcome: Progressing     Problem: RESPIRATORY - ADULT  Goal: Achieves optimal ventilation and oxygenation  Description: INTERVENTIONS:  - Assess for changes in respiratory status  - Assess for changes in mentation and behavior  - Position to facilitate oxygenation and minimize respiratory effort  - Oxygen supplementation based on oxygen saturation or ABGs  - Provide Smoking Cessation handout, if applicable  - Encourage broncho-pulmonary hygiene including cough, deep breathe, Incentive Spirometry  - Assess the need for suctioning and perform as needed  - Assess and instruct to report SOB or any respiratory difficulty  - Respiratory Therapy support as indicated  - Manage/alleviate anxiety  - Monitor for signs/symptoms of CO2 retention  Outcome: Progressing     Problem: SKIN/TISSUE INTEGRITY - ADULT  Goal: Skin integrity remains intact  Description:  INTERVENTIONS  - Assess and document risk factors for pressure ulcer development  - Assess and document skin integrity  - Monitor for areas of redness and/or skin breakdown  - Initiate interventions, skin care algorithm/standards of care as needed  Outcome: Progressing

## 2024-10-03 NOTE — DIETARY NOTE
Dietitian Note     Pt screened for MST of 2 for \"unsure of weight loss.\" No weight hx to review. Attempted to call Saul Garcia several times today with no answer at each attempt. Pt on minced/moist diet with thin liquids. Per RN, pt ate very well at breakfast today. Pt requires 1:1 feed. Pt with eyes closed at visit, did not wake up or respond to name call. H/o dementia, alert to self only. No risk at this time. Will ONS to support PO intakes for now. Will monitor and f/u on LOS date. Please consult RD for further nutrition interventions if needed.      Janell Ng RD, LDN  Clinical Dietitian  P: [phone]

## 2024-10-03 NOTE — CONSULTS
Hematology/Oncology Initial Consultation Note    Patient Name: Joann Williamson  Medical Record Number: [id number]    YOB: 1940   Date of Consultation: 10/3/2024   Physician requesting consultation: Dr. Davis,      Reason for Consultation:  Jonan Williamson was seen today for the diagnosis of VTE    =============================================================  History of Present Illness:      84 year old female who presents from her nursing home with extensive left leg swelling. Patient is non verbal and has dementia.     As per chart, she is noted to be wheel chair bound and was noted to have developed swelling of her lower extremity.    She is essentially wheel chair bound, per her son, she was able to stand to transfer until recently when she developed swelling.  She has dementia and is non-verbal.      Past Medical History:  Past Medical History:    Dementia (HCC)    Esophageal reflux    Essential hypertension       History reviewed. No pertinent surgical history.    Home Medications:  No current facility-administered medications on file prior to encounter.     Current Outpatient Medications on File Prior to Encounter   Medication Sig Dispense Refill    metoprolol tartrate 25 MG Oral Tab Take 1 tablet (25 mg total) by mouth 2 (two) times daily.      Polyethylene Glycol 3350 17 g Oral Powd Pack Take 17 g by mouth daily.      FLEET ENEMA 7-19 GM/118ML Rectal Enema Place 1 enema (118 mL total) rectally once as needed.      phenylephrine-min oil-rita (HEMORRHOIDAL) 0.25-14-74.9 % Rectal Ointment Place 1 g rectally every evening.      pantoprazole 20 MG Oral Tab EC Take 1 tablet (20 mg total) by mouth every morning before breakfast.      ergocalciferol 1.25 MG (37704 UT) Oral Cap Take 1 capsule (50,000 Units total) by mouth every 14 (fourteen) days.      memantine 5 MG Oral Tab Take 1 tablet (5 mg total) by mouth 2 (two) times daily.      acetaminophen 325 MG Oral Tab Take 2 tablets (650 mg  total) by mouth every 8 (eight) hours as needed for Pain.      bisacodyl 10 MG Rectal Suppos Place 1 suppository (10 mg total) rectally daily as needed.      Skin Protectants, Misc. (REMEDY PHYTOPLEX HYDRAGUARD) External Cream Apply 1 Application topically every 8 (eight) hours as needed. Apply to both buttocks      docusate 50 MG/5ML Oral Liquid Take 10 mL (100 mg total) by mouth 2 (two) times daily as needed for constipation.         Current Inpatient Medications:  Inpatient Meds:   polyethylene glycol (PEG 3350)  17 g Oral Daily    pantoprazole  20 mg Oral QAM AC    [START ON 10/13/2024] ergocalciferol  50,000 Units Oral Q14 Days    memantine  5 mg Oral BID    metoprolol tartrate  25 mg Oral BID    phenylephrine-min oil-rita  1 g Rectal QPM      continuous dose heparin 1,000 Units/hr (10/03/24 0800)       PRN Meds:    docusate    acetaminophen    bisacodyl    influenza virus vaccine PF    Allergies:   No Known Allergies    Psychosocial History:  Social History     Social History Narrative    Not on file     Social History     Socioeconomic History    Marital status:    Tobacco Use    Smoking status: Never    Smokeless tobacco: Never   Substance and Sexual Activity    Alcohol use: Not Currently     Social Determinants of Health     Food Insecurity: Unknown (10/1/2024)    Food Insecurity     Food Insecurity: Patient unable to answer   Transportation Needs: Unknown (10/1/2024)    Transportation Needs     Lack of Transportation: Patient unable to answer   Housing Stability: Unknown (10/1/2024)    Housing Stability     Housing Instability: Patient unable to answer       Family Medical History:  History reviewed. No pertinent family history.    Review of Systems:  A 10-point ROS was done with pertinent positives and negative per the HPI    Vital Signs:  Height: --  Weight: 54.5 kg (120 lb 1.6 oz) (10/03 0511)  BSA (Calculated - sq m): --  Pulse: 96 (10/03 0831)  BP: 118/103 (10/03 0831)  Temp: 97.3 °F (36.3 °C)  (10/03 0831)  Do Not Use - Resp Rate: --  SpO2: 97 % (10/03 0831)      Wt Readings from Last 6 Encounters:   10/03/24 54.5 kg (120 lb 1.6 oz)   02/09/22 72.6 kg (160 lb)       ECOG PS: 3-4    Physical Examination:  General: Patient is awake.  Eyes: EOMI, PERRL  ENT: Oropharynx is clear, no adenopathy  CV: Regular rate and rhythm, normal S1S2, no murmurs, no LE edema  Respiratory: Lungs clear to auscultation bilaterally  GI/Abd: Soft, non-tender with normoactive bowel sounds, no hepatosplenomegaly  Neurological: Grossly intact   Lymphatics: No palpable cervical, supraclavicular, axillary, or inguinal lymphadenopathy  Skin: no rashes or petechiae  Noted to have left lower extremity swelling       Laboratory:  Recent Labs   Lab 10/01/24  1312   WBC 6.9   HGB 11.2*   HCT 36.2   .0   MCV 91.4   RDW 13.0   NEPRELIM 5.01       Recent Labs   Lab 10/01/24  1312      K 4.4      CO2 28.0   BUN 26*   CREATSERUM 0.66   *   CA 9.5   TP 6.3   ALB 3.7   ALKPHO 83   AST 13   ALT <7*   BILT 0.3       Recent Labs   Lab 10/01/24  1312 10/02/24  0021 10/02/24  0723 10/02/24  1455 10/02/24  2139 10/03/24  0704   INR 1.16  --   --   --   --   --    PTT 27.5 184.2* 117.8* 33.1 96.1* 104.6*       Imaging:    US of the lower extremity reviewed    Impression & Plan:     85yo old lady with dementia, who is currently non verbal and bed bound presents with lower extremity swelling.  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     She is currently on heparin IV, PTT supra therapeutic and is tolerating well.     We would recommend any oral direct thrombin inhibitor. Patient can start the medication orally tonight and heparin needs to be turned off immediately. Start with loading dose and then switch to maintenance dose    Given her limited mobility would recommend long term anticoagulation. Full dose for the first 3-6 months  and then can  switch to prophylactic dose to prevent risk of thrombosis if patient is able to tolerate.     Monitor for bleeding. No thrombophilia work up indicated.       Thank you for the consult.      Delisa Raines MD  Hematology/Medical Oncology

## 2024-10-04 ENCOUNTER — APPOINTMENT (OUTPATIENT)
Dept: GENERAL RADIOLOGY | Facility: HOSPITAL | Age: 84
End: 2024-10-04
Attending: INTERNAL MEDICINE
Payer: MEDICARE

## 2024-10-04 VITALS
BODY MASS INDEX: 23.35 KG/M2 | TEMPERATURE: 97 F | SYSTOLIC BLOOD PRESSURE: 103 MMHG | HEIGHT: 62 IN | DIASTOLIC BLOOD PRESSURE: 56 MMHG | WEIGHT: 126.88 LBS | OXYGEN SATURATION: 99 % | RESPIRATION RATE: 16 BRPM | HEART RATE: 75 BPM

## 2024-10-04 LAB
APTT PPP: 34.6 SECONDS (ref 23–36)
GLUCOSE BLDC GLUCOMTR-MCNC: 83 MG/DL (ref 70–99)

## 2024-10-04 PROCEDURE — 71045 X-RAY EXAM CHEST 1 VIEW: CPT | Performed by: INTERNAL MEDICINE

## 2024-10-04 NOTE — BH PROGRESS NOTE
BRIEF PROGRESS NOTE    I saw patient face-to-face in person, and interviewed and examined her in her room 341 at [facility] unit 3SW on 10/3/2024.  Chart reviewed.  Discussed with primary nightshift nurse Barbara, dayshift nurse Faby, hematology Dr. Delisa Raines and patient's son Berto.           No new complaints or acute problems reported by patient, family or staff since our last visit yesterday morning.  LLE swelling may be a little less marked today, but not much reduction in swelling noted.  No report of labored breathing, hypoxemia or adverse change in vital signs.  Patient doing fine otherwise, eating and drinking enough to keep herself going.         Patient seen by Dr. Raines who agreed with interventional radiology recommendations against IVC filter and against stenting of compressed left common iliac vein.  She recommended continuation of standard medical management for proximal DVT with transition from IV heparin to DOAC once IV heparin therapeutic.  Anticipate discharge back to SNF tomorrow.       Patient's family appeared to understand and agree with assessment and care plan.    Full progress note to follow.        Dylon Davis MD

## 2024-10-04 NOTE — PLAN OF CARE
Problem: Patient Centered Care  Goal: Patient preferences are identified and integrated in the patient's plan of care  Description: Interventions:  - What would you like us to know as we care for you?   - Provide timely, complete, and accurate information to patient/family  - Incorporate patient and family knowledge, values, beliefs, and cultural backgrounds into the planning and delivery of care  - Encourage patient/family to participate in care and decision-making at the level they choose  - Honor patient and family perspectives and choices  Outcome: Progressing     Problem: Patient/Family Goals  Goal: Patient/Family Long Term Goal  Description: Patient's Long Term Goal:     Interventions:  - See additional Care Plan goals for specific interventions  Outcome: Progressing  Goal: Patient/Family Short Term Goal  Description: Patient's Short Term Goal:     Interventions:   - See additional Care Plan goals for specific interventions  Outcome: Progressing     Problem: CARDIOVASCULAR - ADULT  Goal: Maintains optimal cardiac output and hemodynamic stability  Description: INTERVENTIONS:  - Monitor vital signs, rhythm, and trends  - Monitor for bleeding, hypotension and signs of decreased cardiac output  - Evaluate effectiveness of vasoactive medications to optimize hemodynamic stability  - Monitor arterial and/or venous puncture sites for bleeding and/or hematoma  - Assess quality of pulses, skin color and temperature  - Assess for signs of decreased coronary artery perfusion - ex. Angina  - Evaluate fluid balance, assess for edema, trend weights  Outcome: Progressing  Goal: Absence of cardiac arrhythmias or at baseline  Description: INTERVENTIONS:  - Continuous cardiac monitoring, monitor vital signs, obtain 12 lead EKG if indicated  - Evaluate effectiveness of antiarrhythmic and heart rate control medications as ordered  - Initiate emergency measures for life threatening arrhythmias  - Monitor electrolytes and administer  replacement therapy as ordered  Outcome: Progressing     Problem: RESPIRATORY - ADULT  Goal: Achieves optimal ventilation and oxygenation  Description: INTERVENTIONS:  - Assess for changes in respiratory status  - Assess for changes in mentation and behavior  - Position to facilitate oxygenation and minimize respiratory effort  - Oxygen supplementation based on oxygen saturation or ABGs  - Provide Smoking Cessation handout, if applicable  - Encourage broncho-pulmonary hygiene including cough, deep breathe, Incentive Spirometry  - Assess the need for suctioning and perform as needed  - Assess and instruct to report SOB or any respiratory difficulty  - Respiratory Therapy support as indicated  - Manage/alleviate anxiety  - Monitor for signs/symptoms of CO2 retention  Outcome: Progressing     Problem: SKIN/TISSUE INTEGRITY - ADULT  Goal: Skin integrity remains intact  Description: INTERVENTIONS  - Assess and document risk factors for pressure ulcer development  - Assess and document skin integrity  - Monitor for areas of redness and/or skin breakdown  - Initiate interventions, skin care algorithm/standards of care as needed  Outcome: Progressing

## 2024-10-04 NOTE — DISCHARGE SUMMARY
[facility]  part of [facility]    Discharge Summary    Joann Williamson Patient Status:  Inpatient    1940 MRN [id number]   Location [facility] 3W/SW Attending Dylon Davis MD   Hosp Day # 3 PCP Dylon Davis MD     Date of Admission: 10/1/2024    Date of Discharge: 10/4/2024     Disposition: Return to [facility] memory care unit, starting with subacute rehab (GRIFFIN), then resume long-term care (LTC).     Admitting Diagnosis: Acute bacterial conjunctivitis of left eye [H10.32]  Acute deep vein thrombosis (DVT) of femoral vein of left lower extremity (HCC) [I82.412]    Discharge Diagnosis:   Patient Active Problem List   Diagnosis    Acute deep vein thrombosis (DVT) of femoral vein of left lower extremity (HCC)    Acute bacterial conjunctivitis of left eye     Reason for Admission: DVT    Physical Exam: Unilateral leg swelling LLE up to groin.    History of Present Illness:  84 year-old lady with acute swelling LLE from groin to foot.  First noted by staff at [facility] memory care unit in PM on 2024.  Venous Doppler ultrasound examination ordered to be done at [facility].  When ultrasound examination had not been done by the next morning 10/1, patient sent to [facility] ER.  Seen by Dr. Phelps who diagnosed acute DVT LLE as high as proximal common iliac vein.  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  History and examination showed no clinical evidence of pulmonary embolism, and CT scan chest with IV contrast confirmed absence of pulmonary embolism.  IV heparin started.  Admitted inpatient for further management.    Hospital Course:  Seen by interventional radiology (IR) service, who recommended against invasive intervention.  In particular, IR consultant recommended against iliac vein stenting and also against insertion of IVC filter.  Family notified of diagnosis and  recommendation for standard medical treatment of DVT including transition from IV heparin to direct-acting oral anticoagulant therapy.  Seen by hematology consultants who agreed with conservative treatment.  After IV heparin anticoagulation and therapeutic range, she was transitioned to DOAC with Eliquis.  After 3 nights in the hospital, left foreleg swelling almost completely gone.  Arrangements made for return to [facility] for GRIFF[facility], then to resume LTC in memory care unit.    Consultations: Interventional radiology, hematology    Procedures: Venous Doppler ultrasound examination and venogram left lower extremity, CT chest with IV contrast.    Complications: None.    Discharge Condition: Fair       Discharge Medications        START taking these medications        Instructions Prescription details   apixaban 5 MG Tabs  Commonly known as: Eliquis      Take 2 tabs (10 mg) twice daily for 6 days until 10/10/2024, then reduce to 1 tab (5 mg) twice daily for total of 6 months.  Further management to be determined.   Quantity: 72 tablet  Refills: 5            CONTINUE taking these medications        Instructions Prescription details   acetaminophen 325 MG Tabs  Commonly known as: Tylenol      Take 2 tablets (650 mg total) by mouth every 8 (eight) hours as needed for Pain.   Refills: 0     bisacodyl 10 MG Supp  Commonly known as: Dulcolax      Place 1 suppository (10 mg total) rectally daily as needed.   Refills: 0     docusate 50 MG/5ML Liqd  Commonly known as: Colace      Take 10 mL (100 mg total) by mouth 2 (two) times daily as needed for constipation.   Refills: 0     ergocalciferol 1.25 MG (86559 UT) Caps  Commonly known as: Vitamin D2      Take 1 capsule (50,000 Units total) by mouth every 14 (fourteen) days.   Refills: 0     FLEET ENEMA 7-19 GM/118ML Enem  Commonly known as: FLEET      Place 1 enema (118 mL total) rectally once as needed.   Refills: 0     Hemorrhoidal 0.25-14-74.9 % Oint  Generic drug:  phenylephrine-min oil-rita      Place 1 g rectally every evening.   Refills: 0     memantine 5 MG Tabs  Commonly known as: Namenda      Take 1 tablet (5 mg total) by mouth 2 (two) times daily.   Refills: 0     metoprolol tartrate 25 MG Tabs  Commonly known as: Lopressor      Take 1 tablet (25 mg total) by mouth 2 (two) times daily.   Refills: 0     pantoprazole 20 MG Tbec  Commonly known as: Protonix      Take 1 tablet (20 mg total) by mouth every morning before breakfast.   Refills: 0     Polyethylene Glycol 3350 17 g Pack  Commonly known as: MIRALAX      Take 17 g by mouth daily.   Refills: 0     Remedy Phytoplex Hydraguard Crea      Apply 1 Application topically every 8 (eight) hours as needed. Apply to both buttocks   Refills: 0               Where to Get Your Medications        These medications were sent to [facility] [place], [place] - [address] [phone], [phone]  [address] [facility] [address]      Phone: [phone]   apixaban 5 MG Tabs         Follow up Visits: Follow-up on primary medical care with Dr. Davis at [facility] within 3 days.    Follow up Labs: None pending at the time of discharge.     Other Discharge Instructions: Start PT and OT week 10/7/2024.    LYLE DAVIS MD  10/4/2024  12:52 PM

## 2024-10-04 NOTE — DISCHARGE PLANNING
I called and gave report to nurse Godinez at [facility]. Patient's physical and history were relayed to nursing staff and included past medical history & diagnosis of DVT. Patient will be discharging at 2pm as arranged by social work/case management. Phone number of primary nurse provided for follow up questions.     Code status: FULL    Nichole OLMSTEAD, Discharge Leader [id number]

## 2024-10-04 NOTE — PROGRESS NOTES
[facility]    PROGRESS NOTE    Joann Williamson Patient Status:  Inpatient    1940 MRN [id number]   Location [facility] 3W/SW Attending Dylon Davis MD   Hosp Day # 2 PCP Dylon Davis MD     I saw patient face-to-face in person, and interviewed and examined her in her room 341 at [facility] unit 3SW on 10/3/2024.  Chart reviewed.  Discussed with primary nightshift nurse Barbara, dayshift nurse Faby, hematology Dr. Delisa Raines and patient's son Berto.    HISTORY      CC/HPI:  No new complaints or acute problems reported by patient, family or staff since our last visit yesterday morning.  LLE swelling may be a little less marked today, but not much reduction in swelling noted.  No report of labored breathing, hypoxemia or adverse change in vital signs.  Patient doing fine otherwise, eating and drinking enough to keep herself going.         Patient seen by Dr. Raines who agreed with interventional radiology recommendations against IVC filter and against stenting of compressed left common iliac vein.  She recommended continuation of standard medical management for proximal DVT with transition from IV heparin to DOAC once IV heparin therapeutic.  Anticipate discharge back to SNF tomorrow.    PMSH:  See ASSESSMENT AND PLAN for active problems.  Meds:  See Epic - Manage Orders for current medication list.  ROS:  No report of fever, acute pain, skin breakdown or any acute respiratory, GI, genitourinary, cardiovascular, hematologic, endocrine, visual, auditory, neurologic, psychiatric or musculoskeletal symptoms except as noted above in CC/HPI.  FMH:  N/A.  SH:  Residing at [facility] memory care unit where she receives LTC.  Son Jake has POA-HC.     EXAMINATION     PX:  Alert.  NAD.  Appeared well-nourished and well-hydrated.  Skin pale.  Breathing unlabored.  No recurrent halitosis or swelling over R cheek noted.  No incontinence odor.  Swelling of LLE all the way  up to groin still marked, but possibly a little less so than yesterday.  No redness, not warm to touch.  Incoherent as usual, nearly mute.  Auditory acuity difficult to evaluate given severity of dementia.  She did not appear to recognize me.  Motor exam limited by severe dementia but grossly nonfocal.  Blunt affect, usual behavior.  Data:  Report from primary nightshift nurse Barbara, dayshift nurse Faby, hematology Dr. Delisa Raines and patient's son Berto.  Reports on results of clinical lab and imaging studies done at [facility] ER since arrival on 10/1/2024 all noted.    ASSESSMENT AND PLAN    Acute problems:    LLE swelling - Due to acute common iliac DVT in left lower extremity, apparently due to L common iliac vein compression by R common iliac artery (?May-Thurner syndrome?).  Continue IV heparin.  Transition to DOAC after fully therapeutic anticoagulation achieved on IV heparin.  No compression garments until edema resolved.  Discuss with hematology optimal timing for initiation of ambulation and trial of compression therapy for prevention of recurrent edema / DVT.     Chronic problems:    FEN - At risk.  Continue to monitor intake and balances closely.  Toothache - Resolved last year with antibiotics and tooth extraction.  Tooth decay - Continue regular checkups.    GERD - Appears to be controlled.  Constipation - Controlled.  Continue docusate 100mg liquid bid.  HTN - Monitor and record BP.  Adjust Rx as indicated to keep BP in target range.  Venous insufficiency - Edema difficult to avoid given her habit of sitting all day long with her legs dependent.  However, edema appeared to be controlled with elastic stockings without diuretic therapy until recently.  No compression garments until edema resolved.  Consult with hematology about optimal timing for initiation of compression therapy.  Hearing impairment - Auditory acuity difficult to evaluate given severity of dementia.   Continue Debrox.  Dementia -  Fairly severe.  Continue proxy decisions and supportive care.  Monitor intake and balances closely.  Unsteadiness on feet - Multifactorial.  Continue general falls precautions with additional precautions as indicated specific for patient’s medical condition.  Myalgia - Appears controlled overall.  Monitor for evidence of recurrent muscle ache.    Osteoarthritis - Including TMJ and back pain.  Pain control.  Encouraqge patient to try walking without leaning on her R arm any more than necessary.     Preventive care:    Diet - Recommend low sodium diet.  Habits - Recommend physical exercise activity as tolerated with help, occupation with activities of personal interest and preference, and social engagement, especially with family and friends.   Screening - Recommend COVID test in case of recurrent exposure.  Immunizations - Review history.  Recommend standard immunizations due but not done yet, except in case of medical contraindication.     DISPOSITION    Proxy - DPOA-HC patient's son Jake.  No document on file in [facility] chart.  Obtain copy from SNF records to upload into [facility].  Rescue advance directives - Continue PDNR as documented on POLST form 6/27/2023.  Long-term plan - Continue LTC at [facility] memory care unit.  Next SNF visit:  11/5/2024    Patient's family appeared to understand and agree with assessment and care plan.          Dylon Davis MD

## 2024-10-04 NOTE — PLAN OF CARE
Patient medically cleared for discharge. Tele and IV removed. Report called in by SAMY Varela RN . Daughter updated at bedside. Pt taken to [facility] via [facility].   Problem: Patient Centered Care  Goal: Patient preferences are identified and integrated in the patient's plan of care  Description: Interventions:  - Provide timely, complete, and accurate information to patient/family  - Incorporate patient and family knowledge, values, beliefs, and cultural backgrounds into the planning and delivery of care  - Encourage patient/family to participate in care and decision-making at the level they choose  - Honor patient and family perspectives and choices  Outcome: Adequate for Discharge     Problem: CARDIOVASCULAR - ADULT  Goal: Maintains optimal cardiac output and hemodynamic stability  Description: INTERVENTIONS:  - Monitor vital signs, rhythm, and trends  - Monitor for bleeding, hypotension and signs of decreased cardiac output  - Evaluate effectiveness of vasoactive medications to optimize hemodynamic stability  - Monitor arterial and/or venous puncture sites for bleeding and/or hematoma  - Assess quality of pulses, skin color and temperature  - Assess for signs of decreased coronary artery perfusion - ex. Angina  - Evaluate fluid balance, assess for edema, trend weights  Outcome: Adequate for Discharge  Goal: Absence of cardiac arrhythmias or at baseline  Description: INTERVENTIONS:  - Continuous cardiac monitoring, monitor vital signs, obtain 12 lead EKG if indicated  - Evaluate effectiveness of antiarrhythmic and heart rate control medications as ordered  - Initiate emergency measures for life threatening arrhythmias  - Monitor electrolytes and administer replacement therapy as ordered  Outcome: Adequate for Discharge     Problem: RESPIRATORY - ADULT  Goal: Achieves optimal ventilation and oxygenation  Description: INTERVENTIONS:  - Assess for changes in respiratory status  - Assess for changes  in mentation and behavior  - Position to facilitate oxygenation and minimize respiratory effort  - Oxygen supplementation based on oxygen saturation or ABGs  - Provide Smoking Cessation handout, if applicable  - Encourage broncho-pulmonary hygiene including cough, deep breathe, Incentive Spirometry  - Assess the need for suctioning and perform as needed  - Assess and instruct to report SOB or any respiratory difficulty  - Respiratory Therapy support as indicated  - Manage/alleviate anxiety  - Monitor for signs/symptoms of CO2 retention  Outcome: Adequate for Discharge     Problem: SKIN/TISSUE INTEGRITY - ADULT  Goal: Skin integrity remains intact  Description: INTERVENTIONS  - Assess and document risk factors for pressure ulcer development  - Assess and document skin integrity  - Monitor for areas of redness and/or skin breakdown  - Initiate interventions, skin care algorithm/standards of care as needed  Outcome: Adequate for Discharge

## 2024-10-04 NOTE — CM/SW NOTE
10/04/24 1202   Discharge disposition   Expected discharge disposition [facility]   Post Acute Care Provider [facility]   Discharge transportation [facility]     The patient will be transported to [facility] via [facility] at 2pm.  PCS complete.    The number to call report is [phone].  Mei in admissions is aware of transport time.    RN to inform patient and family.    SW/CM to remain available for support and/or discharge planning.     Brisa Linton MSW, LSW  Discharge Planner [id number]

## 2024-10-05 NOTE — BH PROGRESS NOTE
BRIEF DISCHARGE DAY PROGRESS NOTE    I saw patient face-to-face in person, and interviewed and examined her in her room 341 at [facility] unit 3SW on 10/4/2024.  Chart reviewed.  Discussed with primary dayshift nurse YOLI Hobbs CM and patient's daughter Ruthie and DPOA-HC son John.           No new complaints or acute problems reported by patient, family or staff since our last visit yesterday morning.  Nurse reported occasional continued dry cough, unchanged.  No fever, hypoxemia or change in vital signs.  Portable chest x-ray showed no infiltrate.  LLE swelling much improved today.  Tolerating anticoagulation so far without bleeding.  Transitioned from IV heparin to Eliquis last night.  Cleared by hematology consultant for discharge.  Arrangements made for return to [facility] memory care unit.  Anticipating short course of subacute rehabilitation ([facility]) prior to return to [facility].  Orders in summary written for discharge today.  I will follow-up on primary medical care at SNF.    Patient's family appeared to understand and agree with assessment and care plan.    Full discharge summary note to follow.        Dylon Davis MD